# Patient Record
Sex: FEMALE | Race: WHITE | HISPANIC OR LATINO | URBAN - METROPOLITAN AREA
[De-identification: names, ages, dates, MRNs, and addresses within clinical notes are randomized per-mention and may not be internally consistent; named-entity substitution may affect disease eponyms.]

---

## 2017-12-03 ENCOUNTER — EMERGENCY (EMERGENCY)
Facility: HOSPITAL | Age: 47
LOS: 0 days | Discharge: HOME | End: 2017-12-04
Attending: EMERGENCY MEDICINE
Payer: COMMERCIAL

## 2017-12-03 VITALS
SYSTOLIC BLOOD PRESSURE: 146 MMHG | WEIGHT: 153 LBS | OXYGEN SATURATION: 98 % | HEART RATE: 101 BPM | RESPIRATION RATE: 20 BRPM | DIASTOLIC BLOOD PRESSURE: 83 MMHG | TEMPERATURE: 99 F | HEIGHT: 65 IN

## 2017-12-03 PROCEDURE — 99285 EMERGENCY DEPT VISIT HI MDM: CPT | Mod: 25

## 2017-12-03 NOTE — ED ADULT NURSE NOTE - OBJECTIVE STATEMENT
47yr old female c/o vomiting and abdominal pain. patient stated abdominal pain started yesterday that became worst today. patient pointing it in the in the epigastric area and it radiates to the back described as generalized aches.

## 2017-12-03 NOTE — ED ADULT TRIAGE NOTE - CHIEF COMPLAINT QUOTE
Pt started vomiting yesterday with abdominal pain, but today her abdominal pain got worse in the umbilicus area. Pt states she is having back pain and generalized aches.

## 2017-12-04 LAB
ALBUMIN SERPL ELPH-MCNC: 3.9 G/DL — SIGNIFICANT CHANGE UP (ref 3.3–5)
ALP SERPL-CCNC: 70 U/L — SIGNIFICANT CHANGE UP (ref 40–120)
ALT FLD-CCNC: 20 U/L — SIGNIFICANT CHANGE UP (ref 12–78)
ANION GAP SERPL CALC-SCNC: 9 MMOL/L — SIGNIFICANT CHANGE UP (ref 5–17)
APPEARANCE UR: CLEAR — SIGNIFICANT CHANGE UP
AST SERPL-CCNC: 22 U/L — SIGNIFICANT CHANGE UP (ref 15–37)
BASOPHILS # BLD AUTO: 0 K/UL — SIGNIFICANT CHANGE UP (ref 0–0.2)
BASOPHILS NFR BLD AUTO: 0.4 % — SIGNIFICANT CHANGE UP (ref 0–2)
BILIRUB SERPL-MCNC: 0.8 MG/DL — SIGNIFICANT CHANGE UP (ref 0.2–1.2)
BILIRUB UR-MCNC: NEGATIVE — SIGNIFICANT CHANGE UP
BUN SERPL-MCNC: 11 MG/DL — SIGNIFICANT CHANGE UP (ref 7–23)
CALCIUM SERPL-MCNC: 8.4 MG/DL — LOW (ref 8.5–10.1)
CHLORIDE SERPL-SCNC: 105 MMOL/L — SIGNIFICANT CHANGE UP (ref 96–108)
CO2 SERPL-SCNC: 26 MMOL/L — SIGNIFICANT CHANGE UP (ref 22–31)
COLOR SPEC: YELLOW — SIGNIFICANT CHANGE UP
CREAT SERPL-MCNC: 0.68 MG/DL — SIGNIFICANT CHANGE UP (ref 0.5–1.3)
DIFF PNL FLD: NEGATIVE — SIGNIFICANT CHANGE UP
EOSINOPHIL # BLD AUTO: 0.1 K/UL — SIGNIFICANT CHANGE UP (ref 0–0.5)
EOSINOPHIL NFR BLD AUTO: 1.2 % — SIGNIFICANT CHANGE UP (ref 0–6)
GLUCOSE SERPL-MCNC: 117 MG/DL — HIGH (ref 70–99)
GLUCOSE UR QL: NEGATIVE MG/DL — SIGNIFICANT CHANGE UP
HCG SERPL-ACNC: <1 MIU/ML — SIGNIFICANT CHANGE UP
HCT VFR BLD CALC: 38.3 % — SIGNIFICANT CHANGE UP (ref 34.5–45)
HGB BLD-MCNC: 13.1 G/DL — SIGNIFICANT CHANGE UP (ref 11.5–15.5)
KETONES UR-MCNC: ABNORMAL
LEUKOCYTE ESTERASE UR-ACNC: ABNORMAL
LIDOCAIN IGE QN: 194 U/L — SIGNIFICANT CHANGE UP (ref 73–393)
LYMPHOCYTES # BLD AUTO: 1 K/UL — SIGNIFICANT CHANGE UP (ref 1–3.3)
LYMPHOCYTES # BLD AUTO: 11 % — LOW (ref 13–44)
MCHC RBC-ENTMCNC: 28.3 PG — SIGNIFICANT CHANGE UP (ref 27–34)
MCHC RBC-ENTMCNC: 34.2 GM/DL — SIGNIFICANT CHANGE UP (ref 32–36)
MCV RBC AUTO: 82.8 FL — SIGNIFICANT CHANGE UP (ref 80–100)
MONOCYTES # BLD AUTO: 0.4 K/UL — SIGNIFICANT CHANGE UP (ref 0–0.9)
MONOCYTES NFR BLD AUTO: 4.5 % — SIGNIFICANT CHANGE UP (ref 2–14)
NEUTROPHILS # BLD AUTO: 7.7 K/UL — HIGH (ref 1.8–7.4)
NEUTROPHILS NFR BLD AUTO: 82.9 % — HIGH (ref 43–77)
NITRITE UR-MCNC: NEGATIVE — SIGNIFICANT CHANGE UP
PH UR: 9 — HIGH (ref 5–8)
PLATELET # BLD AUTO: 252 K/UL — SIGNIFICANT CHANGE UP (ref 150–400)
POTASSIUM SERPL-MCNC: 3.5 MMOL/L — SIGNIFICANT CHANGE UP (ref 3.5–5.3)
POTASSIUM SERPL-SCNC: 3.5 MMOL/L — SIGNIFICANT CHANGE UP (ref 3.5–5.3)
PROT SERPL-MCNC: 8.2 GM/DL — SIGNIFICANT CHANGE UP (ref 6–8.3)
PROT UR-MCNC: 15 MG/DL
RBC # BLD: 4.63 M/UL — SIGNIFICANT CHANGE UP (ref 3.8–5.2)
RBC # FLD: 13.7 % — SIGNIFICANT CHANGE UP (ref 11–15)
SODIUM SERPL-SCNC: 140 MMOL/L — SIGNIFICANT CHANGE UP (ref 135–145)
SP GR SPEC: 1.01 — SIGNIFICANT CHANGE UP (ref 1.01–1.02)
UROBILINOGEN FLD QL: NEGATIVE MG/DL — SIGNIFICANT CHANGE UP
WBC # BLD: 9.3 K/UL — SIGNIFICANT CHANGE UP (ref 3.8–10.5)
WBC # FLD AUTO: 9.3 K/UL — SIGNIFICANT CHANGE UP (ref 3.8–10.5)

## 2017-12-04 PROCEDURE — 74177 CT ABD & PELVIS W/CONTRAST: CPT | Mod: 26

## 2017-12-04 RX ORDER — LIDOCAINE 4 G/100G
15 CREAM TOPICAL ONCE
Qty: 0 | Refills: 0 | Status: COMPLETED | OUTPATIENT
Start: 2017-12-04 | End: 2017-12-04

## 2017-12-04 RX ORDER — SODIUM CHLORIDE 9 MG/ML
1000 INJECTION INTRAMUSCULAR; INTRAVENOUS; SUBCUTANEOUS ONCE
Qty: 0 | Refills: 0 | Status: COMPLETED | OUTPATIENT
Start: 2017-12-04 | End: 2017-12-04

## 2017-12-04 RX ORDER — FAMOTIDINE 10 MG/ML
20 INJECTION INTRAVENOUS ONCE
Qty: 0 | Refills: 0 | Status: COMPLETED | OUTPATIENT
Start: 2017-12-04 | End: 2017-12-04

## 2017-12-04 RX ORDER — IOHEXOL 300 MG/ML
30 INJECTION, SOLUTION INTRAVENOUS ONCE
Qty: 0 | Refills: 0 | Status: COMPLETED | OUTPATIENT
Start: 2017-12-04 | End: 2017-12-04

## 2017-12-04 RX ORDER — ONDANSETRON 8 MG/1
4 TABLET, FILM COATED ORAL ONCE
Qty: 0 | Refills: 0 | Status: COMPLETED | OUTPATIENT
Start: 2017-12-04 | End: 2017-12-04

## 2017-12-04 RX ADMIN — Medication 30 MILLILITER(S): at 00:29

## 2017-12-04 RX ADMIN — IOHEXOL 30 MILLILITER(S): 300 INJECTION, SOLUTION INTRAVENOUS at 00:29

## 2017-12-04 RX ADMIN — FAMOTIDINE 20 MILLIGRAM(S): 10 INJECTION INTRAVENOUS at 00:29

## 2017-12-04 RX ADMIN — LIDOCAINE 15 MILLILITER(S): 4 CREAM TOPICAL at 00:29

## 2017-12-04 RX ADMIN — ONDANSETRON 4 MILLIGRAM(S): 8 TABLET, FILM COATED ORAL at 00:29

## 2017-12-04 RX ADMIN — SODIUM CHLORIDE 2000 MILLILITER(S): 9 INJECTION INTRAMUSCULAR; INTRAVENOUS; SUBCUTANEOUS at 00:30

## 2017-12-04 NOTE — ED PROVIDER NOTE - MEDICAL DECISION MAKING DETAILS
abd pain, vomiting, benign abd, will do labs, gi cocktail, reassess, have drink po contrast. low suspicion for obstruction but if not improving may do ct scan.

## 2017-12-04 NOTE — ED PROVIDER NOTE - PROGRESS NOTE DETAILS
Amadou: pt tolerating po, pain improved, informed of lesions on liver and need to follow up with doctor/gi this week for further imaging. pt and boyfriend understands. return precautions, stable for d/c.

## 2017-12-04 NOTE — ED PROVIDER NOTE - OBJECTIVE STATEMENT
48 y/o female no pmh c/o generalized abd discomfort with multiple episodes of vomiting since yesterday. Feels a little more distended, mild obstipation. No diarrhea/brbpr/melena, no cp, sob, fever, dysuria. Decreased eating today because of symptoms. No belly surgeries.     ROS: No fever/chills. No eye pain/changes in vision, No ear pain/sore throat/dysphagia, No chest pain/palpitations. No SOB/cough/. + abd pain, and vomiting,  No dysuria/frequency/discharge, No headache. No Dizziness.    No rashes or breaks in skin. No numbness/tingling/weakness.

## 2017-12-05 DIAGNOSIS — K59.00 CONSTIPATION, UNSPECIFIED: ICD-10-CM

## 2017-12-05 DIAGNOSIS — K76.9 LIVER DISEASE, UNSPECIFIED: ICD-10-CM

## 2017-12-05 DIAGNOSIS — R10.9 UNSPECIFIED ABDOMINAL PAIN: ICD-10-CM

## 2017-12-05 DIAGNOSIS — R11.10 VOMITING, UNSPECIFIED: ICD-10-CM

## 2017-12-05 LAB
CULTURE RESULTS: SIGNIFICANT CHANGE UP
SPECIMEN SOURCE: SIGNIFICANT CHANGE UP

## 2019-07-20 ENCOUNTER — EMERGENCY (EMERGENCY)
Facility: HOSPITAL | Age: 49
LOS: 1 days | Discharge: HOME | End: 2019-07-20
Attending: EMERGENCY MEDICINE
Payer: COMMERCIAL

## 2019-07-20 ENCOUNTER — EMERGENCY (EMERGENCY)
Facility: HOSPITAL | Age: 49
LOS: 0 days | Discharge: HOME | End: 2019-07-20
Attending: EMERGENCY MEDICINE
Payer: MEDICAID

## 2019-07-20 VITALS
SYSTOLIC BLOOD PRESSURE: 125 MMHG | DIASTOLIC BLOOD PRESSURE: 73 MMHG | HEART RATE: 95 BPM | RESPIRATION RATE: 14 BRPM | OXYGEN SATURATION: 97 % | TEMPERATURE: 98 F

## 2019-07-20 VITALS
WEIGHT: 190.04 LBS | HEIGHT: 63 IN | TEMPERATURE: 98 F | DIASTOLIC BLOOD PRESSURE: 90 MMHG | RESPIRATION RATE: 18 BRPM | HEART RATE: 73 BPM | SYSTOLIC BLOOD PRESSURE: 153 MMHG | OXYGEN SATURATION: 100 %

## 2019-07-20 VITALS
TEMPERATURE: 98 F | HEIGHT: 66 IN | WEIGHT: 169.98 LBS | SYSTOLIC BLOOD PRESSURE: 150 MMHG | HEART RATE: 109 BPM | RESPIRATION RATE: 22 BRPM | DIASTOLIC BLOOD PRESSURE: 63 MMHG | OXYGEN SATURATION: 98 %

## 2019-07-20 DIAGNOSIS — N20.1 CALCULUS OF URETER: ICD-10-CM

## 2019-07-20 DIAGNOSIS — Z88.0 ALLERGY STATUS TO PENICILLIN: ICD-10-CM

## 2019-07-20 DIAGNOSIS — D64.9 ANEMIA, UNSPECIFIED: ICD-10-CM

## 2019-07-20 DIAGNOSIS — Z98.51 TUBAL LIGATION STATUS: Chronic | ICD-10-CM

## 2019-07-20 DIAGNOSIS — R10.9 UNSPECIFIED ABDOMINAL PAIN: ICD-10-CM

## 2019-07-20 LAB
ALBUMIN SERPL ELPH-MCNC: 3.8 G/DL — SIGNIFICANT CHANGE UP (ref 3.3–5)
ALBUMIN SERPL ELPH-MCNC: 4.2 G/DL — SIGNIFICANT CHANGE UP (ref 3.3–5)
ALP SERPL-CCNC: 67 U/L — SIGNIFICANT CHANGE UP (ref 40–120)
ALP SERPL-CCNC: 73 U/L — SIGNIFICANT CHANGE UP (ref 40–120)
ALT FLD-CCNC: 20 U/L — SIGNIFICANT CHANGE UP (ref 10–45)
ALT FLD-CCNC: 32 U/L — SIGNIFICANT CHANGE UP (ref 12–78)
ANION GAP SERPL CALC-SCNC: 10 MMOL/L — SIGNIFICANT CHANGE UP (ref 5–17)
ANION GAP SERPL CALC-SCNC: 17 MMOL/L — SIGNIFICANT CHANGE UP (ref 5–17)
APPEARANCE UR: CLEAR — SIGNIFICANT CHANGE UP
APPEARANCE UR: CLEAR — SIGNIFICANT CHANGE UP
AST SERPL-CCNC: 23 U/L — SIGNIFICANT CHANGE UP (ref 15–37)
AST SERPL-CCNC: 25 U/L — SIGNIFICANT CHANGE UP (ref 10–40)
BASOPHILS # BLD AUTO: 0 K/UL — SIGNIFICANT CHANGE UP (ref 0–0.2)
BASOPHILS # BLD AUTO: 0.02 K/UL — SIGNIFICANT CHANGE UP (ref 0–0.2)
BASOPHILS NFR BLD AUTO: 0 % — SIGNIFICANT CHANGE UP (ref 0–2)
BASOPHILS NFR BLD AUTO: 0.2 % — SIGNIFICANT CHANGE UP (ref 0–2)
BILIRUB SERPL-MCNC: 0.7 MG/DL — SIGNIFICANT CHANGE UP (ref 0.2–1.2)
BILIRUB SERPL-MCNC: 0.7 MG/DL — SIGNIFICANT CHANGE UP (ref 0.2–1.2)
BILIRUB UR-MCNC: NEGATIVE — SIGNIFICANT CHANGE UP
BILIRUB UR-MCNC: NEGATIVE — SIGNIFICANT CHANGE UP
BUN SERPL-MCNC: 8 MG/DL — SIGNIFICANT CHANGE UP (ref 7–23)
BUN SERPL-MCNC: 9 MG/DL — SIGNIFICANT CHANGE UP (ref 7–23)
CALCIUM SERPL-MCNC: 8.7 MG/DL — SIGNIFICANT CHANGE UP (ref 8.5–10.1)
CALCIUM SERPL-MCNC: 9.4 MG/DL — SIGNIFICANT CHANGE UP (ref 8.4–10.5)
CHLORIDE SERPL-SCNC: 100 MMOL/L — SIGNIFICANT CHANGE UP (ref 96–108)
CHLORIDE SERPL-SCNC: 110 MMOL/L — HIGH (ref 96–108)
CO2 SERPL-SCNC: 20 MMOL/L — LOW (ref 22–31)
CO2 SERPL-SCNC: 22 MMOL/L — SIGNIFICANT CHANGE UP (ref 22–31)
COLOR SPEC: SIGNIFICANT CHANGE UP
COLOR SPEC: YELLOW — SIGNIFICANT CHANGE UP
CREAT ?TM UR-MCNC: 51 MG/DL — SIGNIFICANT CHANGE UP
CREAT SERPL-MCNC: 0.68 MG/DL — SIGNIFICANT CHANGE UP (ref 0.5–1.3)
CREAT SERPL-MCNC: 0.77 MG/DL — SIGNIFICANT CHANGE UP (ref 0.5–1.3)
DIFF PNL FLD: ABNORMAL
DIFF PNL FLD: NEGATIVE — SIGNIFICANT CHANGE UP
EOSINOPHIL # BLD AUTO: 0 K/UL — SIGNIFICANT CHANGE UP (ref 0–0.5)
EOSINOPHIL # BLD AUTO: 0.01 K/UL — SIGNIFICANT CHANGE UP (ref 0–0.5)
EOSINOPHIL NFR BLD AUTO: 0.1 % — SIGNIFICANT CHANGE UP (ref 0–6)
EOSINOPHIL NFR BLD AUTO: 0.1 % — SIGNIFICANT CHANGE UP (ref 0–6)
EPI CELLS # UR: ABNORMAL
GAS PNL BLDV: SIGNIFICANT CHANGE UP
GLUCOSE SERPL-MCNC: 134 MG/DL — HIGH (ref 70–99)
GLUCOSE SERPL-MCNC: 143 MG/DL — HIGH (ref 70–99)
GLUCOSE UR QL: NEGATIVE MG/DL — SIGNIFICANT CHANGE UP
GLUCOSE UR QL: NEGATIVE — SIGNIFICANT CHANGE UP
HCG SERPL-ACNC: <1 MIU/ML — SIGNIFICANT CHANGE UP
HCT VFR BLD CALC: 37.3 % — SIGNIFICANT CHANGE UP (ref 34.5–45)
HCT VFR BLD CALC: 39 % — SIGNIFICANT CHANGE UP (ref 34.5–45)
HGB BLD-MCNC: 13 G/DL — SIGNIFICANT CHANGE UP (ref 11.5–15.5)
HGB BLD-MCNC: 13.2 G/DL — SIGNIFICANT CHANGE UP (ref 11.5–15.5)
IMM GRANULOCYTES NFR BLD AUTO: 0.5 % — SIGNIFICANT CHANGE UP (ref 0–1.5)
KETONES UR-MCNC: ABNORMAL
KETONES UR-MCNC: NEGATIVE — SIGNIFICANT CHANGE UP
LACTATE SERPL-SCNC: 2.1 MMOL/L — HIGH (ref 0.7–2)
LEUKOCYTE ESTERASE UR-ACNC: ABNORMAL
LEUKOCYTE ESTERASE UR-ACNC: NEGATIVE — SIGNIFICANT CHANGE UP
LIDOCAIN IGE QN: 130 U/L — SIGNIFICANT CHANGE UP (ref 73–393)
LYMPHOCYTES # BLD AUTO: 0.86 K/UL — LOW (ref 1–3.3)
LYMPHOCYTES # BLD AUTO: 1.3 K/UL — SIGNIFICANT CHANGE UP (ref 1–3.3)
LYMPHOCYTES # BLD AUTO: 16.9 % — SIGNIFICANT CHANGE UP (ref 13–44)
LYMPHOCYTES # BLD AUTO: 9.7 % — LOW (ref 13–44)
MCHC RBC-ENTMCNC: 28.1 PG — SIGNIFICANT CHANGE UP (ref 27–34)
MCHC RBC-ENTMCNC: 28.9 PG — SIGNIFICANT CHANGE UP (ref 27–34)
MCHC RBC-ENTMCNC: 33.8 GM/DL — SIGNIFICANT CHANGE UP (ref 32–36)
MCHC RBC-ENTMCNC: 34.8 GM/DL — SIGNIFICANT CHANGE UP (ref 32–36)
MCV RBC AUTO: 83 FL — SIGNIFICANT CHANGE UP (ref 80–100)
MCV RBC AUTO: 83 FL — SIGNIFICANT CHANGE UP (ref 80–100)
MONOCYTES # BLD AUTO: 0.24 K/UL — SIGNIFICANT CHANGE UP (ref 0–0.9)
MONOCYTES # BLD AUTO: 0.3 K/UL — SIGNIFICANT CHANGE UP (ref 0–0.9)
MONOCYTES NFR BLD AUTO: 2.7 % — SIGNIFICANT CHANGE UP (ref 2–14)
MONOCYTES NFR BLD AUTO: 4.3 % — SIGNIFICANT CHANGE UP (ref 2–14)
NEUTROPHILS # BLD AUTO: 6.1 K/UL — SIGNIFICANT CHANGE UP (ref 1.8–7.4)
NEUTROPHILS # BLD AUTO: 7.7 K/UL — HIGH (ref 1.8–7.4)
NEUTROPHILS NFR BLD AUTO: 78.7 % — HIGH (ref 43–77)
NEUTROPHILS NFR BLD AUTO: 86.8 % — HIGH (ref 43–77)
NITRITE UR-MCNC: NEGATIVE — SIGNIFICANT CHANGE UP
NITRITE UR-MCNC: NEGATIVE — SIGNIFICANT CHANGE UP
NRBC # BLD: 0 /100 WBCS — SIGNIFICANT CHANGE UP (ref 0–0)
PH UR: 7 — SIGNIFICANT CHANGE UP (ref 5–8)
PH UR: 8 — SIGNIFICANT CHANGE UP (ref 5–8)
PLATELET # BLD AUTO: 235 K/UL — SIGNIFICANT CHANGE UP (ref 150–400)
PLATELET # BLD AUTO: 255 K/UL — SIGNIFICANT CHANGE UP (ref 150–400)
POTASSIUM SERPL-MCNC: 3.3 MMOL/L — LOW (ref 3.5–5.3)
POTASSIUM SERPL-MCNC: 3.8 MMOL/L — SIGNIFICANT CHANGE UP (ref 3.5–5.3)
POTASSIUM SERPL-SCNC: 3.3 MMOL/L — LOW (ref 3.5–5.3)
POTASSIUM SERPL-SCNC: 3.8 MMOL/L — SIGNIFICANT CHANGE UP (ref 3.5–5.3)
PROT SERPL-MCNC: 7.7 G/DL — SIGNIFICANT CHANGE UP (ref 6–8.3)
PROT SERPL-MCNC: 8.2 GM/DL — SIGNIFICANT CHANGE UP (ref 6–8.3)
PROT UR-MCNC: NEGATIVE MG/DL — SIGNIFICANT CHANGE UP
PROT UR-MCNC: NEGATIVE — SIGNIFICANT CHANGE UP
RBC # BLD: 4.49 M/UL — SIGNIFICANT CHANGE UP (ref 3.8–5.2)
RBC # BLD: 4.7 M/UL — SIGNIFICANT CHANGE UP (ref 3.8–5.2)
RBC # FLD: 14.2 % — SIGNIFICANT CHANGE UP (ref 10.3–14.5)
RBC # FLD: 14.3 % — SIGNIFICANT CHANGE UP (ref 10.3–14.5)
RBC CASTS # UR COMP ASSIST: ABNORMAL /HPF (ref 0–4)
SODIUM SERPL-SCNC: 137 MMOL/L — SIGNIFICANT CHANGE UP (ref 135–145)
SODIUM SERPL-SCNC: 142 MMOL/L — SIGNIFICANT CHANGE UP (ref 135–145)
SODIUM UR-SCNC: 96 MMOL/L — SIGNIFICANT CHANGE UP
SP GR SPEC: 1 — LOW (ref 1.01–1.02)
SP GR SPEC: 1.01 — SIGNIFICANT CHANGE UP (ref 1.01–1.02)
UROBILINOGEN FLD QL: NEGATIVE MG/DL — SIGNIFICANT CHANGE UP
UROBILINOGEN FLD QL: NEGATIVE — SIGNIFICANT CHANGE UP
WBC # BLD: 7.8 K/UL — SIGNIFICANT CHANGE UP (ref 3.8–10.5)
WBC # BLD: 8.87 K/UL — SIGNIFICANT CHANGE UP (ref 3.8–10.5)
WBC # FLD AUTO: 7.8 K/UL — SIGNIFICANT CHANGE UP (ref 3.8–10.5)
WBC # FLD AUTO: 8.87 K/UL — SIGNIFICANT CHANGE UP (ref 3.8–10.5)
WBC UR QL: SIGNIFICANT CHANGE UP

## 2019-07-20 PROCEDURE — 76770 US EXAM ABDO BACK WALL COMP: CPT | Mod: 26

## 2019-07-20 PROCEDURE — 74176 CT ABD & PELVIS W/O CONTRAST: CPT | Mod: 26

## 2019-07-20 PROCEDURE — 99285 EMERGENCY DEPT VISIT HI MDM: CPT

## 2019-07-20 RX ORDER — KETOROLAC TROMETHAMINE 30 MG/ML
30 SYRINGE (ML) INJECTION ONCE
Refills: 0 | Status: DISCONTINUED | OUTPATIENT
Start: 2019-07-20 | End: 2019-07-20

## 2019-07-20 RX ORDER — TAMSULOSIN HYDROCHLORIDE 0.4 MG/1
0.4 CAPSULE ORAL ONCE
Refills: 0 | Status: COMPLETED | OUTPATIENT
Start: 2019-07-20 | End: 2019-07-20

## 2019-07-20 RX ORDER — OXYCODONE HYDROCHLORIDE 5 MG/1
1 TABLET ORAL
Qty: 12 | Refills: 0
Start: 2019-07-20 | End: 2019-07-22

## 2019-07-20 RX ORDER — SODIUM CHLORIDE 9 MG/ML
1000 INJECTION INTRAMUSCULAR; INTRAVENOUS; SUBCUTANEOUS ONCE
Refills: 0 | Status: COMPLETED | OUTPATIENT
Start: 2019-07-20 | End: 2019-07-20

## 2019-07-20 RX ORDER — MORPHINE SULFATE 50 MG/1
4 CAPSULE, EXTENDED RELEASE ORAL ONCE
Refills: 0 | Status: DISCONTINUED | OUTPATIENT
Start: 2019-07-20 | End: 2019-07-20

## 2019-07-20 RX ORDER — TAMSULOSIN HYDROCHLORIDE 0.4 MG/1
1 CAPSULE ORAL
Qty: 14 | Refills: 0
Start: 2019-07-20 | End: 2019-08-02

## 2019-07-20 RX ORDER — ONDANSETRON 8 MG/1
4 TABLET, FILM COATED ORAL ONCE
Refills: 0 | Status: COMPLETED | OUTPATIENT
Start: 2019-07-20 | End: 2019-07-20

## 2019-07-20 RX ORDER — IBUPROFEN 200 MG
1 TABLET ORAL
Qty: 20 | Refills: 0
Start: 2019-07-20 | End: 2019-07-24

## 2019-07-20 RX ADMIN — MORPHINE SULFATE 4 MILLIGRAM(S): 50 CAPSULE, EXTENDED RELEASE ORAL at 20:58

## 2019-07-20 RX ADMIN — MORPHINE SULFATE 4 MILLIGRAM(S): 50 CAPSULE, EXTENDED RELEASE ORAL at 09:51

## 2019-07-20 RX ADMIN — MORPHINE SULFATE 4 MILLIGRAM(S): 50 CAPSULE, EXTENDED RELEASE ORAL at 20:20

## 2019-07-20 RX ADMIN — MORPHINE SULFATE 4 MILLIGRAM(S): 50 CAPSULE, EXTENDED RELEASE ORAL at 21:01

## 2019-07-20 RX ADMIN — Medication 30 MILLIGRAM(S): at 10:05

## 2019-07-20 RX ADMIN — SODIUM CHLORIDE 1000 MILLILITER(S): 9 INJECTION INTRAMUSCULAR; INTRAVENOUS; SUBCUTANEOUS at 20:20

## 2019-07-20 RX ADMIN — TAMSULOSIN HYDROCHLORIDE 0.4 MILLIGRAM(S): 0.4 CAPSULE ORAL at 12:37

## 2019-07-20 RX ADMIN — ONDANSETRON 4 MILLIGRAM(S): 8 TABLET, FILM COATED ORAL at 20:20

## 2019-07-20 RX ADMIN — MORPHINE SULFATE 4 MILLIGRAM(S): 50 CAPSULE, EXTENDED RELEASE ORAL at 10:05

## 2019-07-20 RX ADMIN — TAMSULOSIN HYDROCHLORIDE 0.4 MILLIGRAM(S): 0.4 CAPSULE ORAL at 20:58

## 2019-07-20 RX ADMIN — Medication 30 MILLIGRAM(S): at 09:51

## 2019-07-20 RX ADMIN — SODIUM CHLORIDE 1000 MILLILITER(S): 9 INJECTION INTRAMUSCULAR; INTRAVENOUS; SUBCUTANEOUS at 09:52

## 2019-07-20 NOTE — ED ADULT NURSE NOTE - OBJECTIVE STATEMENT
Pt presented to the ed c/o of LLQ abdominal pain, pelvic pain an difficulty urinating. Pt reported that pain radiating to the back. but denies any burning on urination. Left flank pain is tender

## 2019-07-20 NOTE — ED PROVIDER NOTE - CLINICAL SUMMARY MEDICAL DECISION MAKING FREE TEXT BOX
48F h/o HLD, anemia, diagnosed with L ureteral calculi earlier today p/w abdominal pain x1d. CT demonstrating mild L hydroureteronephrosis due to a 2mm stone within the L UVJ and multiple other non-obstructing L ureteral stones. Tachycardic, +L CVA tenderness.   - Pain control, IVF   - Will obtain labs, UA, urine lytes and renal US to eval for worsening hydronephrosis  - Possible urology consult 48F h/o HLD, anemia, diagnosed with L ureteral calculi earlier today p/w abdominal pain x1d. CT demonstrating mild L hydroureteronephrosis due to a 2mm stone within the L UVJ and multiple other non-obstructing L ureteral stones. Tachycardic, +L CVA tenderness.   - Pain control, IVF   - Will obtain labs, UA, urine lytes and renal US to eval for worsening hydronephrosis  - Possible urology consult    GARIMA Yuan MD: Pt is a 49 y/o female with PMH HLD, anemia, with dx of 2mm L ureteral calculus in L UPJ earlier today, p/w abdominal pain, n/v and inability to tolerate PO. Pt had sudden onset LLQ pain to L flank at 7am. Pain was controlled at OSH and when pt went home, she started to have n/v, has been unable to take percocet and motrin. Was able to tolerate 2 tabs of tylenol, however, pain has been severe.  Pt now having urinary frequency and urgency, urinating very small amounts of urine. Denies f/c, hematuria. Will repeat labs, obtain renal US to evaluate hydronephrosis, control pain and nausea, hydrate and reassess.

## 2019-07-20 NOTE — ED PROVIDER NOTE - OBJECTIVE STATEMENT
48F h/o HLD, anemia, diagnosed with L ureteral calculi earlier today p/w abdominal pain x1d. Patients daughter-in-law states that she developed severe LLQ abdominal pain at 7am this morning that was constant and radiating around her left flank. The pain was associated with lightheadedness, nausea and NBNB emesis. Pt went to [facility] earlier this AM were CT demonstrated mild L hydroureteronephrosis due to a 2mm stone within the L UVJ and multiple other non-obstructing L ureteral stones. UA showed moderate blood, few WBCs, negative LE/nitrites. Pt received morphine x1 and flomax x1 and was discharged home. When the pt returned home, her L flank pain gradually returned. Pt took motrin and percocet around 2pm, which was immediately followed by an episode of emesis and did not improve the pain. Since this time, pt has had several more episodes of emesis and worsening pain, prompting her to come to the [facility] ED. OF note, pt states she initially voided a large amount after taking flomax earlier today, but has since been able to void only small amounts at a time. Pt endorses severe flank pain, pressure in her lower abdomen, lightheadedness and nausea, but denies fevers/chills, chest pain, shortness of breath and hematuria.

## 2019-07-20 NOTE — ED PROVIDER NOTE - PMH
Anemia    No pertinent past medical history    No pertinent past medical history    Ureteral calculus, left

## 2019-07-20 NOTE — ED PROVIDER NOTE - OBJECTIVE STATEMENT
48 year old female with PMH of HLD and anemia hx on iron otherwise presenting to ED  due to left sided flank pain since this AM with pain to left lower back and with dysuria and bladder pressure where there were no symptoms yesterday of any issues.

## 2019-07-20 NOTE — ED ADULT NURSE NOTE - OBJECTIVE STATEMENT
47 y/o female presented to the ED via Ambulation from home. pt was seen at [facility] earlier today and was Dx with L Kidney stone 2mm. pt went home with flomax and Percocet. pt vomited medication and unable to control pain. pt has +cva tenderness. able to urinate. awaiting MD dispo

## 2019-07-20 NOTE — ED ADULT NURSE NOTE - CHPI ED NUR SYMPTOMS NEG
no dysuria/no hematuria/no diarrhea/no abdominal distension/no blood in stool/no chills/no fever/no burning urination

## 2019-07-21 VITALS
SYSTOLIC BLOOD PRESSURE: 133 MMHG | TEMPERATURE: 99 F | DIASTOLIC BLOOD PRESSURE: 75 MMHG | RESPIRATION RATE: 18 BRPM | OXYGEN SATURATION: 97 % | HEART RATE: 70 BPM

## 2019-07-21 PROBLEM — D64.9 ANEMIA, UNSPECIFIED: Chronic | Status: ACTIVE | Noted: 2019-07-20

## 2019-07-21 LAB
CULTURE RESULTS: SIGNIFICANT CHANGE UP
GAS PNL BLDV: SIGNIFICANT CHANGE UP
SPECIMEN SOURCE: SIGNIFICANT CHANGE UP

## 2019-07-21 PROCEDURE — 85014 HEMATOCRIT: CPT

## 2019-07-21 PROCEDURE — 81003 URINALYSIS AUTO W/O SCOPE: CPT

## 2019-07-21 PROCEDURE — 99284 EMERGENCY DEPT VISIT MOD MDM: CPT | Mod: 25

## 2019-07-21 PROCEDURE — G0378: CPT

## 2019-07-21 PROCEDURE — 96375 TX/PRO/DX INJ NEW DRUG ADDON: CPT

## 2019-07-21 PROCEDURE — 85027 COMPLETE CBC AUTOMATED: CPT

## 2019-07-21 PROCEDURE — 76770 US EXAM ABDO BACK WALL COMP: CPT

## 2019-07-21 PROCEDURE — 84295 ASSAY OF SERUM SODIUM: CPT

## 2019-07-21 PROCEDURE — 96374 THER/PROPH/DIAG INJ IV PUSH: CPT

## 2019-07-21 PROCEDURE — 82435 ASSAY OF BLOOD CHLORIDE: CPT

## 2019-07-21 PROCEDURE — 82947 ASSAY GLUCOSE BLOOD QUANT: CPT

## 2019-07-21 PROCEDURE — 84132 ASSAY OF SERUM POTASSIUM: CPT

## 2019-07-21 PROCEDURE — 84300 ASSAY OF URINE SODIUM: CPT

## 2019-07-21 PROCEDURE — 96376 TX/PRO/DX INJ SAME DRUG ADON: CPT

## 2019-07-21 PROCEDURE — 82330 ASSAY OF CALCIUM: CPT

## 2019-07-21 PROCEDURE — 83605 ASSAY OF LACTIC ACID: CPT

## 2019-07-21 PROCEDURE — 82803 BLOOD GASES ANY COMBINATION: CPT

## 2019-07-21 PROCEDURE — 80053 COMPREHEN METABOLIC PANEL: CPT

## 2019-07-21 PROCEDURE — 99234 HOSP IP/OBS SM DT SF/LOW 45: CPT

## 2019-07-21 PROCEDURE — 82570 ASSAY OF URINE CREATININE: CPT

## 2019-07-21 RX ORDER — SODIUM CHLORIDE 9 MG/ML
1000 INJECTION INTRAMUSCULAR; INTRAVENOUS; SUBCUTANEOUS
Refills: 0 | Status: DISCONTINUED | OUTPATIENT
Start: 2019-07-21 | End: 2019-07-24

## 2019-07-21 RX ORDER — ONDANSETRON 8 MG/1
1 TABLET, FILM COATED ORAL
Qty: 8 | Refills: 0
Start: 2019-07-21 | End: 2019-07-23

## 2019-07-21 RX ORDER — KETOROLAC TROMETHAMINE 30 MG/ML
15 SYRINGE (ML) INJECTION ONCE
Refills: 0 | Status: DISCONTINUED | OUTPATIENT
Start: 2019-07-21 | End: 2019-07-21

## 2019-07-21 RX ORDER — SODIUM CHLORIDE 9 MG/ML
1000 INJECTION, SOLUTION INTRAVENOUS ONCE
Refills: 0 | Status: COMPLETED | OUTPATIENT
Start: 2019-07-21 | End: 2019-07-21

## 2019-07-21 RX ORDER — IBUPROFEN 200 MG
600 TABLET ORAL ONCE
Refills: 0 | Status: COMPLETED | OUTPATIENT
Start: 2019-07-21 | End: 2019-07-21

## 2019-07-21 RX ORDER — POTASSIUM CHLORIDE 20 MEQ
40 PACKET (EA) ORAL ONCE
Refills: 0 | Status: COMPLETED | OUTPATIENT
Start: 2019-07-21 | End: 2019-07-21

## 2019-07-21 RX ADMIN — Medication 600 MILLIGRAM(S): at 09:17

## 2019-07-21 RX ADMIN — MORPHINE SULFATE 4 MILLIGRAM(S): 50 CAPSULE, EXTENDED RELEASE ORAL at 00:24

## 2019-07-21 RX ADMIN — SODIUM CHLORIDE 125 MILLILITER(S): 9 INJECTION INTRAMUSCULAR; INTRAVENOUS; SUBCUTANEOUS at 04:16

## 2019-07-21 RX ADMIN — Medication 40 MILLIEQUIVALENT(S): at 02:11

## 2019-07-21 RX ADMIN — Medication 15 MILLIGRAM(S): at 00:25

## 2019-07-21 RX ADMIN — SODIUM CHLORIDE 1000 MILLILITER(S): 9 INJECTION, SOLUTION INTRAVENOUS at 02:10

## 2019-07-21 NOTE — ED CDU PROVIDER INITIAL DAY NOTE - PROGRESS NOTE DETAILS
Pt sleeping. VS stable from last reading. No complaints of pain since arrival to CDU. Will continue to monitor Patient seen and evaluated at bedside, resting comfortably. NAD. Pt reports return of mild L flank pain radiating to LLQ. No n/v, no f/c. VSS. On exam, Abd soft and NT, + mild L CVA tenderness. Will give motrin and PO challenge at breakfast, likely d/c home this AM. Pt tolerating PO. Reports pain has improved. Pt has rx for IBU 600mg, percocet, and flomax. Will send rx for zofran and d/c home with urology f/u. Stable for d/c, d/w Dr. Chu.

## 2019-07-21 NOTE — ED CDU PROVIDER INITIAL DAY NOTE - OBJECTIVE STATEMENT
48F h/o HLD, anemia, diagnosed with L ureteral calculi earlier today p/w abdominal pain x1d. Patients daughter-in-law states that she developed severe LLQ abdominal pain at 7am this morning that was constant and radiating around her left flank. The pain was associated with lightheadedness, nausea and NBNB emesis. Pt went to [facility] earlier this AM were CT demonstrated mild L hydroureteronephrosis due to a 2mm stone within the L UVJ and multiple other non-obstructing L ureteral stones. UA showed moderate blood, few WBCs, negative LE/nitrites. Pt received morphine x1 and flomax x1 and was discharged home. When the pt returned home, her L flank pain gradually returned. Pt took motrin and percocet around 2pm, which was immediately followed by an episode of emesis and did not improve the pain. Since this time, pt has had several more episodes of emesis and worsening pain, prompting her to come to the [facility] ED. OF note, pt states she initially voided a large amount after taking flomax earlier today, but has since been able to void only small amounts at a time. Pt endorses severe flank pain, pressure in her lower abdomen, lightheadedness and nausea, but denies fevers/chills, chest pain, shortness of breath and hematuria.  ED Course: Pt given 1L of NS, 8mg Morphine, Zofran, and toradol with improvement of pain. Pt reports urine output normalized. At time of CDU PA interview pt reported great improvement of pain and no nausea. Pt taken placed in CDU for pain control and frequent evaluation.  PCP: Dr. Dina Alas

## 2019-07-21 NOTE — ED ADULT NURSE REASSESSMENT NOTE - COMFORT CARE
plan of care explained
plan of care explained/warm blanket provided/darkened lights/repositioned/po fluids offered

## 2019-07-21 NOTE — ED CDU PROVIDER DISPOSITION NOTE - ATTENDING CONTRIBUTION TO CARE
Patient in CDU for treatment of renal colic.  Unremarkable physical exam.  Feeling improved this morning, tolerating PO.  Will discharge with pain control and outpatient urology follow up.  Yonatan Chu M.D.

## 2019-07-21 NOTE — ED CDU PROVIDER DISPOSITION NOTE - NSFOLLOWUPINSTRUCTIONS_ED_ALL_ED_FT
1) Follow-up with your primary care provider within 2-3 days.       Follow-up with urology in 2-3 days. Bring all printed results to discuss.    2) Pain can be managed with Acetaminophen 975mg (3 regular strength tablets) every 6 hours and Ibuprofen 600mg (3 regular strength pills) every 8 hours. For severe pain Oxycodone may be taken as previously prescribed, this medication causes drowsiness avoid lifting, operating machinery, and drinking alcohol when taking oxycodone.    3) Make sure to stay hydrated. Take Flomax 0.4mg at night.    4) Continue to take all other medications as directed.    5) Return to the emergency room immediately if your symptoms worsen or persist, or if you have a high or concerning fever, back pain, abdominal pain, severe vomiting, difficulty urinating, pain with urination, new rash, discharge, or if any other concerning or questionable symptoms. 1) Follow-up with your primary care provider within 2-3 days.       Follow-up with urology in 2-3 days. Bring all printed results to discuss.    2) Pain can be managed with Ibuprofen 600mg every 8 hours as needed, take with food.   May also take Acetaminophen (Tylenol) 650mg every 6-8 hours as needed for pain.     Take your prescribed percocet (contains oxycodone 5mg and acetaminophen 325mg) every 8 hours for severe pain, this medication causes drowsiness *** avoid lifting, operating machinery, and drinking alcohol when taking oxycodone. Be careful not to exceed 4g of acetaminophen (tylenol) in 24 hours.    3) Make sure to stay hydrated. Take Flomax 0.4mg at night as directed. Take zofran 4mg oral tab every 8 hours as needed for nausea.     4) Continue to take all other medications as directed.    5) Return to the emergency room immediately if your symptoms worsen or persist, or if you have a high or concerning fever, back pain, abdominal pain, severe vomiting, difficulty urinating, pain with urination, new rash, discharge, or if any other concerning symptoms.

## 2019-07-21 NOTE — ED ADULT NURSE REASSESSMENT NOTE - NS ED NURSE REASSESS COMMENT FT1
report taken from Cherise SINGH. states pt had good night with no complaints. Will continue to monitor.
Received pt from SAMANTHA Kurtz , received pt alert and responsive, oriented x4, denies any respiratory distress, SOB, or difficulty breathing. Pt transferred to CDU for L flank pain, suprapubic pain management, pain currently 2/10 pt states great improvement in pain symptoms. Placed on IV fluids, tolerating well, urine strainer provided to pt and taught how to use, verbalized understanding.   IV in place, patent and free of signs of infiltration, V/S stable, pt afebrile, pt denies pain at this time. Pt educated on unit and unit rules, instructed patient to notify RN of any needed assistance, Pt verbalizes understanding, Call bell placed within reach. Safety maintained. Will continue to monitor. Family member at bedside.

## 2019-07-21 NOTE — ED CDU PROVIDER INITIAL DAY NOTE - PHYSICAL EXAMINATION
GEN: Pt in NAD, A&O x3.  PSYCH: Affect appropriate.  EYES: Sclera white w/o injection.   ENT: Head NCAT. Nose w/o deformity. No auricular TTP. MMM. Neck supple FROM.   RESP: No chest wall tenderness, CTA b/l, no wheezes, rales, or rhonchi.   CARDIAC: RRR, clear distinct S1, S2, no appreciable murmurs.  ABD: Abdomen soft, non-tender. L sided CVAT.  VASC: 2+ radial and dorsalis pedis pulses b/l. No edema or calf tenderness.  SKIN: No rashes on the trunk.

## 2019-07-21 NOTE — ED CDU PROVIDER DISPOSITION NOTE - CLINICAL COURSE
48F h/o HLD, anemia, diagnosed with L ureteral calculi earlier today p/w abdominal pain x1d. Patients daughter-in-law states that she developed severe LLQ abdominal pain at 7am this morning that was constant and radiating around her left flank. The pain was associated with lightheadedness, nausea and NBNB emesis. Pt went to [facility] earlier this AM were CT demonstrated mild L hydroureteronephrosis due to a 2mm stone within the L UVJ and multiple other non-obstructing L ureteral stones. UA showed moderate blood, few WBCs, negative LE/nitrites. Pt received morphine x1 and flomax x1 and was discharged home. When the pt returned home, her L flank pain gradually returned. Pt took motrin and percocet around 2pm, which was immediately followed by an episode of emesis and did not improve the pain. Since this time, pt has had several more episodes of emesis and worsening pain, prompting her to come to the [facility] ED. OF note, pt states she initially voided a large amount after taking flomax earlier today, but has since been able to void only small amounts at a time. Pt endorses severe flank pain, pressure in her lower abdomen, lightheadedness and nausea, but denies fevers/chills, chest pain, shortness of breath and hematuria.  ED Course: Pt given 1L of NS, 8mg Morphine, Zofran, and toradol with improvement of pain. Pt reports urine output normalized. At time of CDU PA interview pt reported great improvement of pain and no nausea. Pt taken placed in CDU for pain control and frequent evaluation.  CDU Course: ____ 48F h/o HLD, anemia, diagnosed with L ureteral calculi earlier today p/w abdominal pain x1d. Patients daughter-in-law states that she developed severe LLQ abdominal pain at 7am this morning that was constant and radiating around her left flank. The pain was associated with lightheadedness, nausea and NBNB emesis. Pt went to [facility] earlier this AM were CT demonstrated mild L hydroureteronephrosis due to a 2mm stone within the L UVJ and multiple other non-obstructing L ureteral stones. UA showed moderate blood, few WBCs, negative LE/nitrites. Pt received morphine x1 and flomax x1 and was discharged home. When the pt returned home, her L flank pain gradually returned. Pt took motrin and percocet around 2pm, which was immediately followed by an episode of emesis and did not improve the pain. Since this time, pt has had several more episodes of emesis and worsening pain, prompting her to come to the [facility] ED. OF note, pt states she initially voided a large amount after taking flomax earlier today, but has since been able to void only small amounts at a time. Pt endorses severe flank pain, pressure in her lower abdomen, lightheadedness and nausea, but denies fevers/chills, chest pain, shortness of breath and hematuria.  ED Course: Pt given 1L of NS, 8mg Morphine, Zofran, and toradol with improvement of pain. Pt reports urine output normalized. At time of CDU PA interview pt reported great improvement of pain and no nausea. Pt taken placed in CDU for pain control and frequent evaluation.  CDU Course: Pt's pain remained controlled with PO medication. Patient tolerating PO, no n/v, no f/c. Stable for d/c with outpatient f/u.

## 2019-07-21 NOTE — ED CDU PROVIDER DISPOSITION NOTE - NSFOLLOWUPCLINICS_GEN_ALL_ED_FT
[facility]  Urology  [address] [place] [address] - [address] [place] [address]  Phone: [phone]  Fax:   Follow Up Time:

## 2019-07-21 NOTE — ED ADULT NURSE REASSESSMENT NOTE - GENERAL PATIENT STATE
family/SO at bedside/comfortable appearance/cooperative/improvement verbalized
comfortable appearance/improvement verbalized/resting/sleeping/cooperative/family/SO at bedside/smiling/interactive

## 2019-07-21 NOTE — ED CDU PROVIDER INITIAL DAY NOTE - MEDICAL DECISION MAKING DETAILS
GARIMA Yuan MD: 48F h/o HLD, anemia, diagnosed with L ureteral calculi earlier today p/w abdominal pain x1d. Patients daughter-in-law states that she developed severe LLQ abdominal pain at 7am this morning that was constant and radiating around her left flank. The pain was associated with lightheadedness, nausea and NBNB emesis. Pt went to [facility] earlier this AM were CT demonstrated mild L hydroureteronephrosis due to a 2mm stone within the L UVJ and multiple other non-obstructing L ureteral stones. UA showed moderate blood, few WBCs, negative LE/nitrites. Pt received morphine x1 and flomax x1 and was discharged home. When the pt returned home, her L flank pain gradually returned. Pt took motrin and percocet around 2pm, which was immediately followed by an episode of emesis and did not improve the pain. Since this time, pt has had several more episodes of emesis and worsening pain, prompting her to come to the [facility] ED. OF note, pt states she initially voided a large amount after taking flomax earlier today, but has since been able to void only small amounts at a time. Pt endorses severe flank pain, pressure in her lower abdomen, lightheadedness and nausea, but denies fevers/chills, chest pain, shortness of breath and hematuria.  ED Course: Pt given 1L of NS, 8mg Morphine, Zofran, and toradol with improvement of pain. Pt reports urine output normalized. Placed in CDU for pain control and frequent evaluation.

## 2020-07-21 PROBLEM — N20.1 CALCULUS OF URETER: Chronic | Status: ACTIVE | Noted: 2019-07-20

## 2020-07-24 ENCOUNTER — APPOINTMENT (OUTPATIENT)
Dept: UROLOGY | Facility: CLINIC | Age: 50
End: 2020-07-24
Payer: MEDICAID

## 2020-07-24 ENCOUNTER — RESULT REVIEW (OUTPATIENT)
Age: 50
End: 2020-07-24

## 2020-07-24 ENCOUNTER — APPOINTMENT (OUTPATIENT)
Dept: CT IMAGING | Facility: IMAGING CENTER | Age: 50
End: 2020-07-24
Payer: MEDICAID

## 2020-07-24 ENCOUNTER — OUTPATIENT (OUTPATIENT)
Dept: OUTPATIENT SERVICES | Facility: HOSPITAL | Age: 50
LOS: 1 days | End: 2020-07-24
Payer: COMMERCIAL

## 2020-07-24 VITALS — HEART RATE: 76 BPM | SYSTOLIC BLOOD PRESSURE: 135 MMHG | DIASTOLIC BLOOD PRESSURE: 84 MMHG | RESPIRATION RATE: 17 BRPM

## 2020-07-24 VITALS — TEMPERATURE: 97.9 F

## 2020-07-24 DIAGNOSIS — Z63.5 DISRUPTION OF FAMILY BY SEPARATION AND DIVORCE: ICD-10-CM

## 2020-07-24 DIAGNOSIS — M54.9 DORSALGIA, UNSPECIFIED: ICD-10-CM

## 2020-07-24 DIAGNOSIS — Z98.51 TUBAL LIGATION STATUS: Chronic | ICD-10-CM

## 2020-07-24 DIAGNOSIS — Z80.9 FAMILY HISTORY OF MALIGNANT NEOPLASM, UNSPECIFIED: ICD-10-CM

## 2020-07-24 DIAGNOSIS — Z87.442 PERSONAL HISTORY OF URINARY CALCULI: ICD-10-CM

## 2020-07-24 DIAGNOSIS — Z78.9 OTHER SPECIFIED HEALTH STATUS: ICD-10-CM

## 2020-07-24 PROBLEM — Z00.00 ENCOUNTER FOR PREVENTIVE HEALTH EXAMINATION: Status: ACTIVE | Noted: 2020-07-24

## 2020-07-24 PROCEDURE — 74176 CT ABD & PELVIS W/O CONTRAST: CPT | Mod: 26

## 2020-07-24 PROCEDURE — 74176 CT ABD & PELVIS W/O CONTRAST: CPT

## 2020-07-24 PROCEDURE — 99203 OFFICE O/P NEW LOW 30 MIN: CPT

## 2020-07-24 SDOH — SOCIAL STABILITY - SOCIAL INSECURITY: DISRUPTION OF FAMILY BY SEPARATION AND DIVORCE: Z63.5

## 2020-07-24 NOTE — HISTORY OF PRESENT ILLNESS
[FreeTextEntry1] : patient with back pain for one year\par hx of left uvj stone 2 mmJuly 2019 \par no f/u to see if passed\par no hematuria but c/o frequeycn and urgency and dysuria \par found to have uti -- treated with Macrobid but still with sxs after one week \par told to c/u with  \par sex active \par normal menses

## 2020-07-24 NOTE — PHYSICAL EXAM
[General Appearance - Well Developed] : well developed [General Appearance - Well Nourished] : well nourished [General Appearance - In No Acute Distress] : no acute distress [Normal Appearance] : normal appearance [Well Groomed] : well groomed [Respiration, Rhythm And Depth] : normal respiratory rhythm and effort [Edema] : no peripheral edema [Abdomen Soft] : soft [Exaggerated Use Of Accessory Muscles For Inspiration] : no accessory muscle use [Abdomen Tenderness] : non-tender [] : no hepato-splenomegaly [Abdomen Hernia] : no hernia was discovered [Abdomen Mass (___ Cm)] : no abdominal mass palpated [FreeTextEntry1] : ofice Antonio showed no hydro and good emptying of bladder [Costovertebral Angle Tenderness] : no ~M costovertebral angle tenderness [Normal Station and Gait] : the gait and station were normal for the patient's age [No Focal Deficits] : no focal deficits [Affect] : the affect was normal [Oriented To Time, Place, And Person] : oriented to person, place, and time [Mood] : the mood was normal [Not Anxious] : not anxious [Cervical Lymph Nodes Enlarged Posterior Bilaterally] : posterior cervical [Cervical Lymph Nodes Enlarged Anterior Bilaterally] : anterior cervical [Supraclavicular Lymph Nodes Enlarged Bilaterally] : supraclavicular

## 2020-07-24 NOTE — REVIEW OF SYSTEMS
[Eyesight Problems] : eyesight problems [Vomiting] : vomiting [Urine Infection (bladder/kidney)] : bladder/kidney infection [History of kidney stones] : history of kidney stones [Wake up at night to urinate  How many times?  ___] : wakes up to urinate [unfilled] times during the night [Bladder pressure] : experiences bladder pressure [Negative] : Heme/Lymph [see HPI] : see HPI

## 2020-07-24 NOTE — ASSESSMENT
[FreeTextEntry1] : patient with one year hx of back pain from cervial neck to lumbar area\par hx of left UVJ stone lat year\par no f/u after ER visit at that time\par hx of UTI treated kristine thomasid\par neo lcheck urine and CT \par neo lnotify of leandro

## 2020-07-27 LAB
APPEARANCE: CLEAR
BACTERIA UR CULT: NORMAL
BACTERIA: NEGATIVE
BILIRUBIN URINE: NEGATIVE
BLOOD URINE: NEGATIVE
COLOR: NORMAL
GLUCOSE QUALITATIVE U: NEGATIVE
HYALINE CASTS: 0 /LPF
KETONES URINE: NEGATIVE
LEUKOCYTE ESTERASE URINE: NEGATIVE
MICROSCOPIC-UA: NORMAL
NITRITE URINE: NEGATIVE
PH URINE: 5.5
PROTEIN URINE: NEGATIVE
RED BLOOD CELLS URINE: 1 /HPF
SPECIFIC GRAVITY URINE: 1.02
SQUAMOUS EPITHELIAL CELLS: 2 /HPF
UROBILINOGEN URINE: NORMAL
WHITE BLOOD CELLS URINE: 0 /HPF

## 2020-09-11 ENCOUNTER — APPOINTMENT (OUTPATIENT)
Dept: OBGYN | Facility: CLINIC | Age: 50
End: 2020-09-11

## 2020-10-14 ENCOUNTER — EMERGENCY (EMERGENCY)
Facility: HOSPITAL | Age: 50
LOS: 0 days | Discharge: HOME | End: 2020-10-14
Attending: EMERGENCY MEDICINE
Payer: COMMERCIAL

## 2020-10-14 VITALS
HEART RATE: 84 BPM | SYSTOLIC BLOOD PRESSURE: 133 MMHG | DIASTOLIC BLOOD PRESSURE: 82 MMHG | OXYGEN SATURATION: 99 % | HEIGHT: 66 IN | TEMPERATURE: 98 F | WEIGHT: 164.91 LBS | RESPIRATION RATE: 18 BRPM
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
## 2020-10-14 DIAGNOSIS — Z88.0 ALLERGY STATUS TO PENICILLIN: ICD-10-CM

## 2020-10-14 DIAGNOSIS — M54.2 CERVICALGIA: ICD-10-CM

## 2020-10-14 DIAGNOSIS — D64.9 ANEMIA, UNSPECIFIED: ICD-10-CM

## 2020-10-14 DIAGNOSIS — Z98.51 TUBAL LIGATION STATUS: Chronic | ICD-10-CM

## 2020-10-14 DIAGNOSIS — S13.9XXA SPRAIN OF JOINTS AND LIGAMENTS OF UNSPECIFIED PARTS OF NECK, INITIAL ENCOUNTER: ICD-10-CM
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
## 2020-10-14 DIAGNOSIS — S39.92XA UNSPECIFIED INJURY OF LOWER BACK, INITIAL ENCOUNTER: ICD-10-CM

## 2020-10-14 PROCEDURE — 99284 EMERGENCY DEPT VISIT MOD MDM: CPT

## 2020-10-14 PROCEDURE — G1004: CPT

## 2020-10-14 PROCEDURE — 72100 X-RAY EXAM L-S SPINE 2/3 VWS: CPT | Mod: 26

## 2020-10-14 PROCEDURE — 72125 CT NECK SPINE W/O DYE: CPT | Mod: 26,ME

## 2020-10-14 RX ORDER — CYCLOBENZAPRINE HYDROCHLORIDE 10 MG/1
10 TABLET, FILM COATED ORAL ONCE
Refills: 0 | Status: COMPLETED | OUTPATIENT
Start: 2020-10-14 | End: 2020-10-14

## 2020-10-14 RX ORDER — KETOROLAC TROMETHAMINE 30 MG/ML
30 SYRINGE (ML) INJECTION ONCE
Refills: 0 | Status: COMPLETED | OUTPATIENT
Start: 2020-10-14 | End: 2020-10-14

## 2020-10-14 RX ORDER — IBUPROFEN 200 MG
1 TABLET ORAL
Qty: 15 | Refills: 0
Start: 2020-10-14 | End: 2020-10-18

## 2020-10-14 RX ORDER — KETOROLAC TROMETHAMINE 30 MG/ML
30 SYRINGE (ML) INJECTION ONCE
Refills: 0 | Status: DISCONTINUED | OUTPATIENT
Start: 2020-10-14 | End: 2020-10-14

## 2020-10-14 RX ADMIN — Medication 30 MILLIGRAM(S): at 16:52

## 2020-10-14 RX ADMIN — CYCLOBENZAPRINE HYDROCHLORIDE 10 MILLIGRAM(S): 10 TABLET, FILM COATED ORAL at 16:45

## 2020-10-14 RX ADMIN — Medication 30 MILLIGRAM(S): at 16:45

## 2020-10-14 NOTE — ED ADULT NURSE NOTE - NSIMPLEMENTINTERV_GEN_ALL_ED
Implemented All Fall with Harm Risk Interventions:  [place] to call system. Call bell, personal items and telephone within reach. Instruct patient to call for assistance. Room bathroom lighting operational. Non-slip footwear when patient is off stretcher. Physically safe environment: no spills, clutter or unnecessary equipment. Stretcher in lowest position, wheels locked, appropriate side rails in place. Provide visual cue, wrist band, yellow gown, etc. Monitor gait and stability. Monitor for mental status changes and reorient to person, place, and time. Review medications for side effects contributing to fall risk. Reinforce activity limits and safety measures with patient and family. Provide visual clues: red socks.

## 2020-10-14 NOTE — ED PROVIDER NOTE - CARE PLAN
Principal Discharge DX:	Neck strain, initial encounter  Secondary Diagnosis:	Lumbosacral injury, initial encounter
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
## 2020-10-14 NOTE — ED PROVIDER NOTE - PATIENT PORTAL LINK FT
You can access the FollowMyHealth Patient Portal offered by [facility] by registering at the following website: [email or web address]. By joining [employer]’s FollowMyHealth portal, you will also be able to view your health information using other applications (apps) compatible with our system.
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
## 2020-11-23 ENCOUNTER — TRANSCRIPTION ENCOUNTER (OUTPATIENT)
Age: 50
End: 2020-11-23

## 2021-02-03 NOTE — ED ADULT NURSE NOTE - NS ED PATIENT SAFETY CONCERN
Chronic Pain Clinic Note     Encounter Date: 2/3/21    Subjective:   Chief Complaint:   Chief Complaint   Patient presents with    Follow-up     tizanidine and lyrica giving side effects. History of Present Illness:   Rowan Carbajal is a 42 y.o. male seen in the Pain Clinic on 02/03/21 for his history of chronic low back pain. He has a medical history of anxiety/depression and diverticulitis. He has been dealing with low back pain for the last 4 years after he fell off a roof onto a ladder. He describes his pain is primarily in the axial back with some radiation down into bilateral buttocks. He rates his pain a constant 7/10 aching, stabbing pain. His pain is worse when he is upright or doing any activities particularly working. He works as a  . His pain is improved with rest.  He denies any focal leg weakness, new paresthesias, saddle anesthesia, bowel/bladder incontinence. He states his pain is interfering with his everyday activities including work and sleep. Of note, he was previously seen Dr. Hu Jacinto and underwent diagnostic lumbar medial branch blocks on 11/18/2020 in which he reports benefit for a couple hours. He was previously taking tramadol and Flexeril with no benefit. He underwent physical therapy within the last year with no benefit as well. Today, 2/3/2021, patient presents for plan follow-up of chronic low back pain. He reports having side effects on the Lyrica including feeling more anxious and worsening restless legs at night. He states that this is similar to when he tried gabapentin in the past.  He continues to struggle with his low back pain and states it is interfering with his ability to get through work shift. He denies any new symptoms of focal leg weakness, new paresthesias, saddle anesthesia, bowel/bladder incontinence.     History of Interventions:   Surgery: No previous back surgeries  Injections: Lumbar MBBs (11/18/20) - noted benefit Current Treatment Medications:   Paxil - anxiety/depression   Wellbutrin - anxiety/depression  Baclofen 10 mg 3 times daily as needed    Historical Treatment Medications:   NSAIDs - unable to take due to diverticulitis  Flexeril - no benefit  Tramadol - no benefit  Gabapentin - unable to tolerate (worsening anxiety and RLS)  Lyrica - unable to tolerate (worsening anxiety and RLS)    Imaging:  MRI L-spine (7/14/20)        Past Medical History:   Diagnosis Date    Chronic back pain     GERD (gastroesophageal reflux disease)     History of high blood pressure     Hypertension        Past Surgical History:   Procedure Laterality Date    PAIN MANAGEMENT PROCEDURE Bilateral 11/18/2020    L3, L4 medial branch and L5 dorsal rami injection, bilateral performed by Yusef Garner MD at [address]       History reviewed. No pertinent family history.     Social History     Socioeconomic History    Marital status:      Spouse name: Marny Severin Number of children: Not on file    Years of education: Not on file    Highest education level: Not on file   Occupational History    Not on file   Social Needs    Financial resource strain: Not on file    Food insecurity     Worry: Not on file     Inability: Not on file    Transportation needs     Medical: Not on file     Non-medical: Not on file   Tobacco Use    Smoking status: Current Every Day Smoker     Packs/day: 1.00     Types: Cigarettes    Smokeless tobacco: Never Used    Tobacco comment: 0.5 ppd 12/30/20   Substance and Sexual Activity    Alcohol use: Never     Frequency: Never    Drug use: Never    Sexual activity: Not on file   Lifestyle    Physical activity     Days per week: Not on file     Minutes per session: Not on file    Stress: Not on file   Relationships    Social connections     Talks on phone: Not on file     Gets together: Not on file     Attends Oriental orthodox service: Not on file Active member of club or organization: Not on file     Attends meetings of clubs or organizations: Not on file     Relationship status: Not on file    Intimate partner violence     Fear of current or ex partner: Not on file     Emotionally abused: Not on file     Physically abused: Not on file     Forced sexual activity: Not on file   Other Topics Concern    Not on file   Social History Narrative    Not on file       Medications & Allergies:   Current Outpatient Medications   Medication Instructions    baclofen (LIORESAL) 10 mg, Oral, 3 TIMES DAILY    buPROPion (WELLBUTRIN XL) 150 mg, Oral, EVERY MORNING    HYDROcodone-acetaminophen (NORCO) 5-325 MG per tablet 1 tablet, Oral, 2 TIMES DAILY PRN    hydrOXYzine (VISTARIL) 50 mg, Oral, 3 TIMES DAILY PRN    lactobacillus (CULTURELLE) capsule 1 capsule, Oral, DAILY WITH BREAKFAST    lisinopril (PRINIVIL;ZESTRIL) 10 mg, Oral, DAILY    pantoprazole (PROTONIX) 40 mg, Oral, 2 TIMES DAILY BEFORE MEALS    PARoxetine (PAXIL) 20 mg, Oral, DAILY    tiZANidine (ZANAFLEX) 4 mg, Oral, EVERY 8 HOURS PRN       No Known Allergies    Review of Systems:   Constitutional: negative for weight changes or fevers  Genitourinary: negative for bowel/bladder incontinence   Musculoskeletal: positive for low back pain  Neurological: negative for any leg weakness or numbness/tingling  Behavioral/Psych: admits to anxiety/depression   All other systems reviewed and are negative    Objective:     Vitals:    02/03/21 0850   BP: 130/88   Temp: 96.9 °F (36.1 °C)       Constitutional: Pleasant, no acute distress   Head: Normocephalic, atraumatic   Eyes: Conjunctivae normal   Neck: Supple, symmetrical   Lungs: Normal respiratory effort, non-labored breathing   Cardiovascular: Limbs warm and well perfused   Abdomen: Non-protruded   Musculoskeletal: Muscle bulk symmetric, no atrophy, no gross deformities No

## 2021-03-26 ENCOUNTER — TRANSCRIPTION ENCOUNTER (OUTPATIENT)
Age: 51
End: 2021-03-26

## 2021-09-03 ENCOUNTER — TRANSCRIPTION ENCOUNTER (OUTPATIENT)
Age: 51
End: 2021-09-03

## 2023-01-13 NOTE — ED ADULT TRIAGE NOTE - NS ED NOTE AC HIGH RISK COUNTRIES
John Riley MD (PCP) [phone]  Lele Dos Santos MD PhD ([facility] oncology) [phone]
No
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
## 2023-04-27 ENCOUNTER — APPOINTMENT (OUTPATIENT)
Dept: ORTHOPEDIC SURGERY | Facility: CLINIC | Age: 53
End: 2023-04-27
Payer: COMMERCIAL

## 2023-04-27 VITALS — BODY MASS INDEX: 28.66 KG/M2 | HEIGHT: 65 IN | WEIGHT: 172 LBS | TEMPERATURE: 98 F

## 2023-04-27 DIAGNOSIS — M25.511 PAIN IN RIGHT SHOULDER: ICD-10-CM

## 2023-04-27 PROCEDURE — 99204 OFFICE O/P NEW MOD 45 MIN: CPT

## 2023-04-27 RX ORDER — NAPROXEN 500 MG/1
500 TABLET ORAL
Qty: 28 | Refills: 1 | Status: ACTIVE | COMMUNITY
Start: 2023-04-27 | End: 1900-01-01

## 2023-04-28 PROBLEM — M25.511 RIGHT SHOULDER PAIN: Status: ACTIVE | Noted: 2023-04-28

## 2023-04-28 NOTE — ADDENDUM
[FreeTextEntry1] : This note was written by Celestina Pruett on 04/27/2023 acting solely as a scribe for Dr. Riaz Forman.\par \par All medical record entries made by the Scribe were at my, Dr. Riaz Forman, direction and personally dictated by me on 04/27/2023. I have personally reviewed the chart and agree that the record accurately reflects my personal performance of the history, physical exam, assessment and plan.

## 2023-04-28 NOTE — DISCUSSION/SUMMARY
[de-identified] : 53 y/o female with right shoulder pain. \par \par Patient presents with an acute onset right shoulder pain.  A discussion was had with the patient regarding potential sources of inflammatory shoulder discomfort; including rotator cuff tendon dysfunction (including tendonitis vs. internal structural damage), subdeltoid/subacromial bursitis, or impingement of the rotator cuff at the acromion. Other contributing sources of pain may be the acromioclavicular joint or long head of the biceps tendon. Irritation is typically caused either by overhead repetitive activity or as a result of minor injury. \par \par MRI Rx given to patient to further delineate any internal structural derangement to the shoulder. This will allow for better understanding of the severity of disease, and allow for better stratification of treatment strategies (surgical vs. non-surgical). Patient is agreeable to further imaging. \par \par Recommendations: Rest from overhead activity and activity avoidance, ice. NSAIDs Rx given.  MRI imaging\par \par Followup: After MRI

## 2023-04-28 NOTE — PHYSICAL EXAM
[de-identified] : Oriented to time, place, person\par Mood: Normal\par Affect: Normal\par Appearance: Healthy, well appearing, no acute distress.\par Gait: Normal\par Assistive Devices: None\par \par Right shoulder exam:\par \par Inspection: No malalignment, No defects, No atrophy\par Skin: No masses, No lesions\par Neck: Negative Spurling, full ROM, no pain with ROM\par AROM: FF to 130, abduction to 90, ER to 60, IR to lower lumbar\par Painful arc ROM: Pain with ER /IR\par Tenderness: No bicipital tenderness, no tenderness to greater tuberosity/RTC insertion, no anterior shoulder/lesser tuberosity tenderness\par Strength: 4+/5 ER, 4+/5 IR in adduction, 4/5 supraspinatus testing, negative [place]'s test\par AC joint: No TTP/pain with cross arm testing\par Biceps: Speed Negative, Yergason Negative \par Impingement test: + Khan, + Neer\par Vasc: 2+ radial pulse \par Stability: Stable \par Neuro: AIN, PIN, Ulnar nerve intact to motor\par Sensation: Intact to light touch throughout  [de-identified] : Images were reviewed dated 4/23/2023.\par \par 3 views of right shoulder that show no acute fracture or dislocation. There is no glenohumeral and no AC joint degenerative change seen. Type II acromion. There is no significant malalignment. No significant other obvious osseous abnormality, otherwise unremarkable.

## 2023-05-10 NOTE — ED PROVIDER NOTE - CPE EDP [facility] NORM
Medical Assistant Note:  Yuliya Li presents today for lab draw.    Patient seen by provider today: No.   present during visit today: Not Applicable.    Concerns: No Concerns.    Procedure:  Lab draw site: RAC, Needle type: BF, Gauge: 21. Gauze and coban applied    Post Assessment:  Labs drawn without difficulty: Yes.    Discharge Plan:  Departure Mode: Ambulatory.    Face to Face Time: 5.    Jazzmine Chapa CMA             normal...

## 2023-05-25 ENCOUNTER — OUTPATIENT (OUTPATIENT)
Dept: OUTPATIENT SERVICES | Facility: HOSPITAL | Age: 53
LOS: 1 days | End: 2023-05-25
Payer: COMMERCIAL

## 2023-05-25 ENCOUNTER — APPOINTMENT (OUTPATIENT)
Dept: MRI IMAGING | Facility: CLINIC | Age: 53
End: 2023-05-25
Payer: COMMERCIAL

## 2023-05-25 DIAGNOSIS — Z00.00 ENCOUNTER FOR GENERAL ADULT MEDICAL EXAMINATION WITHOUT ABNORMAL FINDINGS: ICD-10-CM

## 2023-05-25 DIAGNOSIS — Z98.51 TUBAL LIGATION STATUS: Chronic | ICD-10-CM

## 2023-05-25 PROCEDURE — 73221 MRI JOINT UPR EXTREM W/O DYE: CPT

## 2023-05-25 PROCEDURE — 73221 MRI JOINT UPR EXTREM W/O DYE: CPT | Mod: 26,RT

## 2023-06-09 ENCOUNTER — NON-APPOINTMENT (OUTPATIENT)
Age: 53
End: 2023-06-09

## 2023-06-15 ENCOUNTER — APPOINTMENT (OUTPATIENT)
Dept: ORTHOPEDIC SURGERY | Facility: CLINIC | Age: 53
End: 2023-06-15
Payer: COMMERCIAL

## 2023-06-15 DIAGNOSIS — M75.01 ADHESIVE CAPSULITIS OF RIGHT SHOULDER: ICD-10-CM

## 2023-06-15 PROCEDURE — 99214 OFFICE O/P EST MOD 30 MIN: CPT

## 2023-06-19 PROBLEM — M75.01 ADHESIVE CAPSULITIS OF RIGHT SHOULDER: Status: ACTIVE | Noted: 2023-06-19

## 2023-06-19 NOTE — HISTORY OF PRESENT ILLNESS
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 MRI of the right shoulder 5/25/23 shows evidence of early adhesive capsulitis. No high grade RTC tear, mild tendonitis. SMall hemangioma . She rates her pain 8/10 and  is taking Tylenol/Motrin for pain with some relief. Denies prior right shoulder pain, numbness or tingling.\par

## 2023-06-19 NOTE — PHYSICAL EXAM
[de-identified] : Oriented to time, place, person\par Mood: Normal\par Affect: Normal\par Appearance: Healthy, well appearing, no acute distress.\par Gait: Normal\par Assistive Devices: None\par \par Right shoulder exam:\par \par Inspection: No malalignment, No defects, No atrophy\par Skin: No masses, No lesions\par Neck: Negative Spurling, full ROM, no pain with ROM\par AROM: FF to 130, abduction to 90, ER to 60, IR to lower lumbar\par Painful arc ROM: Pain with ER /IR\par Tenderness: No bicipital tenderness, no tenderness to greater tuberosity/RTC insertion, no anterior shoulder/lesser tuberosity tenderness\par Strength: 5/5 ER, 5/5 IR in adduction, 5/5 supraspinatus testing, negative [place]'s test\par AC joint: No TTP/pain with cross arm testing\par Biceps: Speed Negative, Yergason Negative \par Impingement test: + Khan, + Neer\par Vasc: 2+ radial pulse \par Stability: Stable \par Neuro: AIN, PIN, Ulnar nerve intact to motor\par Sensation: Intact to light touch throughout  [de-identified] : Images were reviewed dated 4/23/2023.\par \par 3 views of right shoulder that show no acute fracture or dislocation. There is no glenohumeral and no AC joint degenerative change seen. Type II acromion. There is no significant malalignment. No significant other obvious osseous abnormality, otherwise unremarkable. \par \par MRI of the right shoulder 5/25/23 shows evidence of early adhesive capsulitis. No high grade RTC tear, mild tendonitis. Small hemangioma .

## 2023-06-19 NOTE — DISCUSSION/SUMMARY
Lab Results   Component Value Date    HGBA1C 6 0 (H) 12/28/2020       Recent Labs     04/12/21  1152 04/12/21  1609 04/12/21  2102 04/13/21  0754   POCGLU 105 153* 126 122       Blood Sugar Average: Last 72 hrs:  (P) 142 [id number]     Controlled  Hold oral agent  Diabetic diet  Sliding scale coverage  [de-identified] : 51 y/o female with right shoulder adhesive capsulitis\par \par Patient presents with for further follow-up of her right shoulder pain.  There is suggestion of adhesive capsulitis on MRI imaging, as well as a small hemangioma found clinically.  We discussed follow-up with Dr. Yeung in 3-6mos for additional follow up of that incidental lesion seen on MRI imaging.  There is no high-grade rotator cuff injury, and we discussed short-term and long-term outcomes as well as the goal of treatment to reduce pain and restore function. Nonsurgical treatment is typically first-line therapy that may take weeks to months to resolve symptoms; includes rest from overhead activities, NSAIDs, home exercise program versus physical therapy to restore normal strength/ROM/function of the shoulder, and possible corticosteroid injection. \par \par Recommendations: Begin trial of PT, Rx given. Conservative modalities as above (overhead activity rest/activity avoidance until less symptomatic, ice, NSAIDs, home exercise strengthening and stretching program). \par \par Followup: If symptoms progress or persist for additional treatment as needed. \par \par Follow-up with Dr. Yeung in 3-6 months.

## 2023-06-19 NOTE — ADDENDUM
[FreeTextEntry1] : This note was written by Celestina Pruett on 06/15/2023 acting solely as a scribe for Dr. Riaz Forman.\par \par All medical record entries made by the Scribe were at my, Dr. Riaz Forman, direction and personally dictated by me on 06/15/2023. I have personally reviewed the chart and agree that the record accurately reflects my personal performance of the history, physical exam, assessment and plan.

## 2023-09-05 ENCOUNTER — NON-APPOINTMENT (OUTPATIENT)
Age: 53
End: 2023-09-05

## 2023-09-26 PROBLEM — M89.9 LESION OF RIGHT HUMERUS: Status: ACTIVE | Noted: 2023-09-26

## 2023-09-27 ENCOUNTER — APPOINTMENT (OUTPATIENT)
Dept: ORTHOPEDIC SURGERY | Facility: CLINIC | Age: 53
End: 2023-09-27

## 2023-09-27 DIAGNOSIS — M89.9 DISORDER OF BONE, UNSPECIFIED: ICD-10-CM

## 2024-06-17 ENCOUNTER — EMERGENCY (EMERGENCY)
Facility: HOSPITAL | Age: 54
LOS: 1 days | Discharge: HOME | End: 2024-06-17
Attending: STUDENT IN AN ORGANIZED HEALTH CARE EDUCATION/TRAINING PROGRAM
Payer: COMMERCIAL

## 2024-06-17 VITALS
HEIGHT: 65 IN | OXYGEN SATURATION: 95 % | HEART RATE: 95 BPM | SYSTOLIC BLOOD PRESSURE: 130 MMHG | DIASTOLIC BLOOD PRESSURE: 78 MMHG | TEMPERATURE: 98 F | WEIGHT: 175.05 LBS | RESPIRATION RATE: 18 BRPM

## 2024-06-17 DIAGNOSIS — Z98.51 TUBAL LIGATION STATUS: Chronic | ICD-10-CM

## 2024-06-17 PROCEDURE — 99284 EMERGENCY DEPT VISIT MOD MDM: CPT

## 2024-06-17 NOTE — ED ADULT TRIAGE NOTE - CHIEF COMPLAINT QUOTE
right leg pain , sore on right leg, Patient has hx of diabetes, last week pain getting worse pt reports 1 day chills, back pain, and dysuria  pmhx kidney stones

## 2024-06-18 VITALS
SYSTOLIC BLOOD PRESSURE: 122 MMHG | TEMPERATURE: 99 F | OXYGEN SATURATION: 99 % | HEART RATE: 74 BPM | RESPIRATION RATE: 16 BRPM | DIASTOLIC BLOOD PRESSURE: 72 MMHG

## 2024-06-18 LAB
ALBUMIN SERPL ELPH-MCNC: 4.2 G/DL — SIGNIFICANT CHANGE UP (ref 3.3–5)
ALP SERPL-CCNC: 81 U/L — SIGNIFICANT CHANGE UP (ref 40–120)
ALT FLD-CCNC: 19 U/L — SIGNIFICANT CHANGE UP (ref 10–45)
ANION GAP SERPL CALC-SCNC: 12 MMOL/L — SIGNIFICANT CHANGE UP (ref 5–17)
APPEARANCE UR: CLEAR — SIGNIFICANT CHANGE UP
AST SERPL-CCNC: 20 U/L — SIGNIFICANT CHANGE UP (ref 10–40)
BACTERIA # UR AUTO: NEGATIVE /HPF — SIGNIFICANT CHANGE UP
BASOPHILS # BLD AUTO: 0.01 K/UL — SIGNIFICANT CHANGE UP (ref 0–0.2)
BASOPHILS NFR BLD AUTO: 0.2 % — SIGNIFICANT CHANGE UP (ref 0–2)
BILIRUB SERPL-MCNC: 0.9 MG/DL — SIGNIFICANT CHANGE UP (ref 0.2–1.2)
BILIRUB UR-MCNC: NEGATIVE — SIGNIFICANT CHANGE UP
BUN SERPL-MCNC: 7 MG/DL — SIGNIFICANT CHANGE UP (ref 7–23)
CALCIUM SERPL-MCNC: 9.5 MG/DL — SIGNIFICANT CHANGE UP (ref 8.4–10.5)
CAST: 1 /LPF — SIGNIFICANT CHANGE UP (ref 0–4)
CHLORIDE SERPL-SCNC: 107 MMOL/L — SIGNIFICANT CHANGE UP (ref 96–108)
CO2 SERPL-SCNC: 23 MMOL/L — SIGNIFICANT CHANGE UP (ref 22–31)
COLOR SPEC: YELLOW — SIGNIFICANT CHANGE UP
CREAT SERPL-MCNC: 0.6 MG/DL — SIGNIFICANT CHANGE UP (ref 0.5–1.3)
DIFF PNL FLD: NEGATIVE — SIGNIFICANT CHANGE UP
EGFR: 106 ML/MIN/1.73M2 — SIGNIFICANT CHANGE UP
EOSINOPHIL # BLD AUTO: 0.06 K/UL — SIGNIFICANT CHANGE UP (ref 0–0.5)
EOSINOPHIL NFR BLD AUTO: 1.3 % — SIGNIFICANT CHANGE UP (ref 0–6)
FLUAV AG NPH QL: SIGNIFICANT CHANGE UP
FLUBV AG NPH QL: SIGNIFICANT CHANGE UP
GLUCOSE SERPL-MCNC: 110 MG/DL — HIGH (ref 70–99)
GLUCOSE UR QL: NEGATIVE MG/DL — SIGNIFICANT CHANGE UP
HCT VFR BLD CALC: 39.8 % — SIGNIFICANT CHANGE UP (ref 34.5–45)
HGB BLD-MCNC: 13.7 G/DL — SIGNIFICANT CHANGE UP (ref 11.5–15.5)
IMM GRANULOCYTES NFR BLD AUTO: 0.4 % — SIGNIFICANT CHANGE UP (ref 0–0.9)
KETONES UR-MCNC: NEGATIVE MG/DL — SIGNIFICANT CHANGE UP
LEUKOCYTE ESTERASE UR-ACNC: NEGATIVE — SIGNIFICANT CHANGE UP
LYMPHOCYTES # BLD AUTO: 1.27 K/UL — SIGNIFICANT CHANGE UP (ref 1–3.3)
LYMPHOCYTES # BLD AUTO: 28 % — SIGNIFICANT CHANGE UP (ref 13–44)
MCHC RBC-ENTMCNC: 30.4 PG — SIGNIFICANT CHANGE UP (ref 27–34)
MCHC RBC-ENTMCNC: 34.4 GM/DL — SIGNIFICANT CHANGE UP (ref 32–36)
MCV RBC AUTO: 88.2 FL — SIGNIFICANT CHANGE UP (ref 80–100)
MONOCYTES # BLD AUTO: 0.41 K/UL — SIGNIFICANT CHANGE UP (ref 0–0.9)
MONOCYTES NFR BLD AUTO: 9.1 % — SIGNIFICANT CHANGE UP (ref 2–14)
NEUTROPHILS # BLD AUTO: 2.76 K/UL — SIGNIFICANT CHANGE UP (ref 1.8–7.4)
NEUTROPHILS NFR BLD AUTO: 61 % — SIGNIFICANT CHANGE UP (ref 43–77)
NITRITE UR-MCNC: NEGATIVE — SIGNIFICANT CHANGE UP
NRBC # BLD: 0 /100 WBCS — SIGNIFICANT CHANGE UP (ref 0–0)
PH UR: 5.5 — SIGNIFICANT CHANGE UP (ref 5–8)
PLATELET # BLD AUTO: 212 K/UL — SIGNIFICANT CHANGE UP (ref 150–400)
POTASSIUM SERPL-MCNC: 3.8 MMOL/L — SIGNIFICANT CHANGE UP (ref 3.5–5.3)
POTASSIUM SERPL-SCNC: 3.8 MMOL/L — SIGNIFICANT CHANGE UP (ref 3.5–5.3)
PROT SERPL-MCNC: 7.8 G/DL — SIGNIFICANT CHANGE UP (ref 6–8.3)
PROT UR-MCNC: NEGATIVE MG/DL — SIGNIFICANT CHANGE UP
RBC # BLD: 4.51 M/UL — SIGNIFICANT CHANGE UP (ref 3.8–5.2)
RBC # FLD: 13.1 % — SIGNIFICANT CHANGE UP (ref 10.3–14.5)
RBC CASTS # UR COMP ASSIST: 0 /HPF — SIGNIFICANT CHANGE UP (ref 0–4)
RSV RNA NPH QL NAA+NON-PROBE: SIGNIFICANT CHANGE UP
SARS-COV-2 RNA SPEC QL NAA+PROBE: SIGNIFICANT CHANGE UP
SODIUM SERPL-SCNC: 142 MMOL/L — SIGNIFICANT CHANGE UP (ref 135–145)
SP GR SPEC: 1.01 — SIGNIFICANT CHANGE UP (ref 1–1.03)
SQUAMOUS # UR AUTO: 1 /HPF — SIGNIFICANT CHANGE UP (ref 0–5)
UROBILINOGEN FLD QL: 0.2 MG/DL — SIGNIFICANT CHANGE UP (ref 0.2–1)
WBC # BLD: 4.53 K/UL — SIGNIFICANT CHANGE UP (ref 3.8–10.5)
WBC # FLD AUTO: 4.53 K/UL — SIGNIFICANT CHANGE UP (ref 3.8–10.5)
WBC UR QL: 1 /HPF — SIGNIFICANT CHANGE UP (ref 0–5)

## 2024-06-18 PROCEDURE — 80053 COMPREHEN METABOLIC PANEL: CPT

## 2024-06-18 PROCEDURE — 81001 URINALYSIS AUTO W/SCOPE: CPT

## 2024-06-18 PROCEDURE — 87086 URINE CULTURE/COLONY COUNT: CPT

## 2024-06-18 PROCEDURE — 99284 EMERGENCY DEPT VISIT MOD MDM: CPT | Mod: 25

## 2024-06-18 PROCEDURE — 87637 SARSCOV2&INF A&B&RSV AMP PRB: CPT

## 2024-06-18 PROCEDURE — 96374 THER/PROPH/DIAG INJ IV PUSH: CPT

## 2024-06-18 PROCEDURE — 85025 COMPLETE CBC W/AUTO DIFF WBC: CPT

## 2024-06-18 RX ORDER — SIMETHICONE 80 MG/1
80 TABLET, CHEWABLE ORAL ONCE
Refills: 0 | Status: COMPLETED | OUTPATIENT
Start: 2024-06-18 | End: 2024-06-18

## 2024-06-18 RX ORDER — ACETAMINOPHEN 500 MG
1000 TABLET ORAL ONCE
Refills: 0 | Status: COMPLETED | OUTPATIENT
Start: 2024-06-18 | End: 2024-06-18

## 2024-06-18 RX ORDER — SODIUM CHLORIDE 9 MG/ML
1000 INJECTION INTRAMUSCULAR; INTRAVENOUS; SUBCUTANEOUS ONCE
Refills: 0 | Status: COMPLETED | OUTPATIENT
Start: 2024-06-18 | End: 2024-06-18

## 2024-06-18 RX ADMIN — SODIUM CHLORIDE 1000 MILLILITER(S): 9 INJECTION INTRAMUSCULAR; INTRAVENOUS; SUBCUTANEOUS at 03:22

## 2024-06-18 RX ADMIN — Medication 400 MILLIGRAM(S): at 03:23

## 2024-06-18 RX ADMIN — SIMETHICONE 80 MILLIGRAM(S): 80 TABLET, CHEWABLE ORAL at 04:57

## 2024-06-18 NOTE — ED PROVIDER NOTE - CLINICAL SUMMARY MEDICAL DECISION MAKING FREE TEXT BOX
53-year-old female with no significant past medical history presents to emergency department for evaluation of urinary symptoms x 1 day.  Patient is currently endorsing lower abdominal cramping, bilateral low back pain feeling of "being to force urine out".  Associated nausea. With mild suprapubic tenderness on exam. DDx includes UTI vs pyelo, low suspicion for kidney stone. Will evaluate with labs and UA and re-assess.

## 2024-06-18 NOTE — ED PROVIDER NOTE - OBJECTIVE STATEMENT
53-year-old female with no significant past medical history presents to emergency department for evaluation of urinary symptoms x 1 day.  Patient is currently endorsing lower abdominal cramping, bilateral low back pain feeling of "being to force urine out".  Associated nausea.  Patient states she has had urinary tract infections in the past, is unsure of the last time she had 1/was treated with antibiotics.  Also has a history of kidney stones, which were seen on CT and ultrasound imaging many years ago, but no history of active stone requiring urologic intervention.  Denies fevers, chills, chest pain, difficulty breathing, dysuria, vaginal bleeding or discharge, vomiting, diarrhea.

## 2024-06-18 NOTE — ED PROVIDER NOTE - NSFOLLOWUPINSTRUCTIONS_ED_ALL_ED_FT
Today you were evaluated in the emergency department for abdominal pain.     Many things can cause abdominal pain. Many times, abdominal pain is not caused by a disease and will improve without treatment. Your health care provider will do a physical exam to determine if there is a dangerous cause of your pain; blood tests and imaging may help determine the cause of your pain. However, in many cases, no cause may be found and you may need further testing as an outpatient. Monitor your abdominal pain for any changes.     We included a copy of your lab work which is below. Please contact your primary care doctor and let them know you were seen in the emergency room. They will advise you on when it is best to be seen in the office for follow up.     You have a urine culture pending in the lab. This takes 24-48 hours to result. If the test comes back positive for infection, someone will give you a call to send antibiotics to your pharmacy.     SEEK IMMEDIATE MEDICAL CARE IF YOU HAVE ANY OF THE FOLLOWING SYMPTOMS: worsening abdominal pain, uncontrollable vomiting, profuse diarrhea, inability to have bowel movements or pass gas, black or bloody stools, fever accompanying chest pain or back pain, or fainting. These symptoms may represent a serious problem that is an emergency. Do not wait to see if the symptoms will go away. Get medical help right away. Call 911 and do not drive yourself to the hospital.

## 2024-06-18 NOTE — ED PROVIDER NOTE - PATIENT PORTAL LINK FT
You can access the FollowMyHealth Patient Portal offered by [facility] by registering at the following website: [email or web address]. By joining [employer]’s FollowMyHealth portal, you will also be able to view your health information using other applications (apps) compatible with our system.

## 2024-06-18 NOTE — ED PROVIDER NOTE - NSICDXPASTMEDICALHX_GEN_ALL_CORE_FT
PAST MEDICAL HISTORY:  Anemia     No pertinent past medical history     No pertinent past medical history     Ureteral calculus, left

## 2024-06-18 NOTE — ED PROVIDER NOTE - ATTENDING CONTRIBUTION TO CARE
I, Dr. Rosalie Barrios, have personally performed a face to face medical and diagnostic evaluation of the patient. I have discussed with and reviewed the Resident's and/or ACP's and/or Medical/PA/NP student's note and agree with the History, ROS, Physical Exam and MDM unless otherwise indicated. A brief summary of my personal evaluation and impression can be found below.     HPI: see above.     PE: Well appearing, clear lungs normal heart sounds abdomen soft nontender nondistended, no rebound or guarding, no CVA tenderness.     MDM: Patient with presenting with concern for UTI, suprapubic pressure, urinary urgency. Also with generalized body aches, bilateral lower back. Concern for UTI v pyelo v less likely kidney stone. Possible viral syndrome. Given benign exam will start workup with labs and urine and reassess. Will escalate to imaging as necessary. Pain control, urine studies and reassess.    *Patient reassessed at the time of discharge, reports asymptomatic, no abdominal pain or tenderness. Urine studies without sign of infection and labs stable. Patient comfortable going home with close outpatient follow up.

## 2024-06-18 NOTE — ED PROVIDER NOTE - PHYSICAL EXAMINATION
Gen: well appearing, in no acute distress   Head: normal appearing  HEENT: normal conjunctiva, oral mucosa moist, vision grossly intact   Lung: no respiratory distress, speaking in full sentences, CTA b/l, no wheeze, crackles or rhonchi   CV: regular rate and rhythm, no murmurs  Abd: soft, non distended, mild suprapubic tenderness, no CVA tenderness, BHAVANI paraspinal lumbar back pain   MSK: no visible deformities, ambulating without difficulty   Neuro: No focal deficits, AAOx3  Skin: Warm, no rashes   Psych: normal affect

## 2024-06-18 NOTE — ED ADULT NURSE NOTE - OBJECTIVE STATEMENT
53y F  with no significant past medical history presents to emergency department for evaluation of urinary symptoms x 1 day.  Patient is currently endorsing lower abdominal cramping, bilateral low back pain feeling of "being to force urine out".  Associated nausea.  Patient states she has had urinary tract infections in the past, is unsure of the last time she had 1/was treated with antibiotics.  Also has a history of kidney stones, which were seen on CT and ultrasound imaging many years ago, but no history of active stone requiring urologic intervention.  Denies fevers, chills, chest pain, difficulty breathing, dysuria, vaginal bleeding or discharge, vomiting, diarrhea.

## 2024-06-19 LAB
CULTURE RESULTS: SIGNIFICANT CHANGE UP
SPECIMEN SOURCE: SIGNIFICANT CHANGE UP

## 2025-08-28 ENCOUNTER — APPOINTMENT (OUTPATIENT)
Dept: GYNECOLOGIC ONCOLOGY | Facility: CLINIC | Age: 55
End: 2025-08-28
Payer: COMMERCIAL

## 2025-08-28 VITALS
HEIGHT: 65 IN | DIASTOLIC BLOOD PRESSURE: 77 MMHG | BODY MASS INDEX: 26.66 KG/M2 | TEMPERATURE: 98 F | WEIGHT: 160 LBS | SYSTOLIC BLOOD PRESSURE: 136 MMHG | OXYGEN SATURATION: 97 % | HEART RATE: 83 BPM

## 2025-08-28 DIAGNOSIS — D06.9 CARCINOMA IN SITU OF CERVIX, UNSPECIFIED: ICD-10-CM

## 2025-08-28 PROCEDURE — 99459 PELVIC EXAMINATION: CPT

## 2025-08-28 PROCEDURE — 57456 ENDOCERV CURETTAGE W/SCOPE: CPT

## 2025-08-28 PROCEDURE — 99205 OFFICE O/P NEW HI 60 MIN: CPT | Mod: 25

## 2025-08-28 RX ORDER — ALBUTEROL SULFATE 4 MG/1
TABLET ORAL
Refills: 0 | Status: ACTIVE | COMMUNITY

## 2025-09-05 LAB — CORE LAB BIOPSY: NORMAL

## 2025-09-09 ENCOUNTER — RESULT REVIEW (OUTPATIENT)
Age: 55
End: 2025-09-09

## 2025-09-17 ENCOUNTER — TRANSCRIPTION ENCOUNTER (OUTPATIENT)
Age: 55
End: 2025-09-17

## 2025-09-17 ENCOUNTER — APPOINTMENT (OUTPATIENT)
Dept: GYNECOLOGIC ONCOLOGY | Facility: HOSPITAL | Age: 55
End: 2025-09-17

## 2025-09-19 ENCOUNTER — NON-APPOINTMENT (OUTPATIENT)
Age: 55
End: 2025-09-19